# Patient Record
Sex: FEMALE | Race: ASIAN
[De-identification: names, ages, dates, MRNs, and addresses within clinical notes are randomized per-mention and may not be internally consistent; named-entity substitution may affect disease eponyms.]

---

## 2018-05-17 ENCOUNTER — HOSPITAL ENCOUNTER (INPATIENT)
Dept: HOSPITAL 68 - GNO | Age: 33
LOS: 3 days | End: 2018-05-20
Attending: OBSTETRICS & GYNECOLOGY | Admitting: OBSTETRICS & GYNECOLOGY
Payer: COMMERCIAL

## 2018-05-17 VITALS — BODY MASS INDEX: 24.24 KG/M2 | HEIGHT: 64 IN | WEIGHT: 142 LBS

## 2018-05-17 DIAGNOSIS — O36.5930: ICD-10-CM

## 2018-05-17 DIAGNOSIS — Z3A.37: ICD-10-CM

## 2018-05-17 LAB
ABSOLUTE GRANULOCYTE CT: 4.4 /CUMM (ref 1.4–6.5)
BASOPHILS # BLD: 0 /CUMM (ref 0–0.2)
BASOPHILS NFR BLD: 0.3 % (ref 0–2)
EOSINOPHIL # BLD: 0.1 /CUMM (ref 0–0.7)
EOSINOPHIL NFR BLD: 1.9 % (ref 0–5)
ERYTHROCYTE [DISTWIDTH] IN BLOOD BY AUTOMATED COUNT: 13.8 % (ref 11.5–14.5)
GRANULOCYTES NFR BLD: 72.1 % (ref 42.2–75.2)
HCT VFR BLD CALC: 33.4 % (ref 37–47)
LYMPHOCYTES # BLD: 1.3 /CUMM (ref 1.2–3.4)
MCH RBC QN AUTO: 32.5 PG (ref 27–31)
MCHC RBC AUTO-ENTMCNC: 34.1 G/DL (ref 33–37)
MCV RBC AUTO: 95.2 FL (ref 81–99)
MONOCYTES # BLD: 0.3 /CUMM (ref 0.1–0.6)
PLATELET # BLD: 187 /CUMM (ref 130–400)
PMV BLD AUTO: 8.7 FL (ref 7.4–10.4)
RED BLOOD CELL CT: 3.51 /CUMM (ref 4.2–5.4)
WBC # BLD AUTO: 6 /CUMM (ref 4.8–10.8)

## 2018-05-17 NOTE — HISTORY & PHYSICAL
General Information and HPI
MD Statement:
I have seen and personally examined MAINOR WILSON and documented this H&P.
 
The patient is a 32 year old female at [37] weeks and [3] days gestation who 
presented with a chief complaint of [IOL due to IUGR].
 
Source of Information: patient
Exam Limitations: no limitations
History of Present Illness:
33yo,  37 3/7wks, here for IOL due to IUGR, she has no complaints , denies 
ctxs, no VB or LOF, reports GFM. Prenatal care started at 9 wks, complicated by 
GDM, diet controlled. she  was found to have SGA , f/u with growth  scan at [facility],
 5/15/2018,EFW 4lb1oz 5%, normal REYMUNDO, normal doppler.
 
Allergies/Medications
Allergies:
Coded Allergies:
No Known Allergies (18)
 
Home Med list
Calcium Carbonate/Vitamin D3 (Calcium 500 + D Tablet) 500 MG-400 TABLET   1 TAB 
PO BID pregnancy  (Reported)
Folic Acid 1 MG TABLET   1 TAB PO DAILY pregnancy  (Reported)
Iron Carb,Gl/FA/B12/C/Docusate (Ferralet 90 Tablet) 90 MG-1 MG-12 MCG-120 MG-50 
MG TABLET   1 TAB PO DAILY anemia  (Reported)
Prenatal Vit No.130/Iron/FA (Prenatal Tablet) 27 MG IRON-800 MCG TABLET   1 TAB 
PO DAILY pregnancy  (Reported)
 
Compliance With Home Meds: GOOD
 
Past History
 
OB/Gyn History
: 1
Para: 0
Last Menstrual Period:
2017
Estimated Delivery Date:
2018
Past OB/Gyn History: none
 
Medical History
Blood Transfusion Hx: No
Neurological: NONE
EENT: NONE
Cardiovascular: NONE
Respiratory: NONE
Gastrointestinal: NONE
Hepatic: NONE
Renal: NONE
Musculoskeletal: NONE
Psychiatric: NONE
Endocrine: NONE
Blood Disorders: NONE
Cancer(s): NONE
GYN/Reproductive: NONE
 
Surgical History
Pertinent Surgical History: none
 
Past Family/Social History
 
Psychosocial History
Where do you live? Home
Smoking Status: Never Smoked
ETOH Use: denies use
Illicit Drug Use: denies illicit drug use
 
Review of Systems
 
Review of Systems
Constitutional:
Reports: no symptoms. 
EENTM:
Reports: no symptoms. 
Cardiovascular:
Reports: no symptoms. 
Respiratory:
Reports: no symptoms. 
GI:
Reports: no symptoms. 
Genitourinary:
Reports: no symptoms. 
Musculoskeletal:
Reports: no symptoms. 
Skin:
Reports: no symptoms. 
Neurological/Psychological:
Reports: no symptoms. 
Hematologic/Endocrine:
Reports: no symptoms. 
Immunologic/Allergic:
Reports: no symptoms. 
All Other Systems: Reviewed and Negative
 
Exam & Diagnostic Data
Last 24 Hrs of Vital Signs/I&O
 Intake & Output
 
 
  1600  0800  0000
 
Intake Total   
 
Output Total   
 
Balance   
 
    
 
Patient 64.41 kg  
 
Weight   
 
 
 
 
Obstetric Exam
Wgt Gained During Pregnancy:
30lb
Pelvimetry:
adequate
Dilation (cm): 0
Effacement (%): 20
Station: -3
Membranes: intact
Fluid: unknown
Fundal Height (cm): 34
Multiple Gestation? No
Contractions:
none
Infant #1 -
   FHR Baseline: 130
   Category: 1
   Estimated Fetal Weight:
4lb1oz 
   Fetal Presentation:
vertex
Patient for Induction? Yes
Cardona Score
 
 
Cardona Score Response Value
 
Cervix Position: mid-position 1
 
Cervix Consistency: medium 1
 
Cervix Effacement: 0-30% 0
 
Cervix Dilation: closed 0
 
Cervix Station: -3 0
 
Total   2
 
 
Physical Exam:
VSS
General: NAD
Abdomen: soft, gravid, nontender
Ext: DCT (-)
 
Prenatal Labs
Blood Type & Rh:
O positive
Antibody Screen:
negative
Hct/Hgb & Platelets #1:
11.8/36.4%,PLT [id number]
Hct/Hgb & Platelets #2:
9.7/31.4%,PLT [id number]
Rubella:
non immune
VDRL #1:
negative
VDRL #2:
negative
HbsAg:
negative
HIV #1:
negative
HIV #2
negative
1 Hr P
3 Hr P/199/168/107
Group B Strep:
negative
Initial Ultrasound:
IUP at 9 wks
Anatomy Ultrasound:
isolated intracardiac echogenic focus
Ultrasound for EFW:
4lb1oz 
Genetic Testing:
nl
Last 24 Hrs of Labs/Dieter:
 Laboratory Tests
 
18 0854:
Hemoglobin A1c 4.7, CBC w Diff NO MAN DIFF REQ, RBC 3.51  L, MCV 95.2, MCH 32.5 
H, MCHC 34.1, RDW 13.8, MPV 8.7, Gran % 72.1, Lymphocytes % 20.8, Monocytes % 
4.9, Eosinophils % 1.9, Basophils % 0.3, Absolute Granulocytes 4.4, Absolute 
Lymphocytes 1.3, Absolute Monocytes 0.3, Absolute Eosinophils 0.1, Absolute 
Basophils 0, Urinalysis LIGHT  H, Urine Color YEL, Urine Clarity CLEAR, Urine pH
7.0, Ur Specific Gravity 1.010, Urine Protein NEG, Urine Ketones NEG, Urine 
Nitrite NEG, Urine Bilirubin NEG, Urine Urobilinogen 0.2, Ur Leukocyte Esterase 
TRACE  H, Ur Microscopic SEDIMENT EXAMINED, Urine RBC 1-3, Urine WBC 1-3  H, Ur 
Epithelial Cells FEW, Urine Bacteria MOD  H, Urine Hemoglobin NEG, Urine Glucose
NEG
 
 
Assessment/Plan
Assessment/Plan:
33yo,  37 3/7wks,IUGR
1. admit pt, admisison labs.
2. R/B of IOL d/w pt, cervical ripening with misoptostol and del real ballon d/w pt
, she understand and agreed, all questions answered, informed consent obtained. 
del real ballon placed in sterile condition, 30 ml NS used to inflate the balloon, 
1st dose of misoprostol placed. 
3. will monitor closely
 
As Ranked By This Provider
Problem List:
 1. Pregnancy
 
 2. GDM (gestational diabetes mellitus)
 
 3. IUGR (intrauterine growth restriction)
 
 
Core Measures
 
Venous Thromboembolism
VTE Risk Factors Pregnancy/Postpartum
No Mechanical VTE Prophylaxis d/t LowRisk-No Interven Req'd
No VTE Pharm Prophylaxis d/t LowRisk-No Interven Req'd
 
Attending MD Review Statement
 
Attending Statement
Attending MD Statement: examined this patient, discussed with family, discussed 
w/nursing

## 2018-05-17 NOTE — PN- OBGYN
Surgical Brief Attending Note
Brief Attending Note:
Late entry for 1: 30PM
pt c/o ctxs pain
on TOCO: ctxs q 2-3 min, FHR base line 140, moderate variability, + acels, no 
decels
cervix : 1 cm, del real ballon in place. small amount blood seen in tube
will defere 2nd dose of misoprostol. will monitor closely

## 2018-05-17 NOTE — PN- OBGYN
Surgical Brief Attending Note
Brief Attending Note:
Pt c/o feels ctxs pain
on TOCO: ctxs q 2-4 min, FHT baseline 130, moderate variability, + acels, no 
decels. 
VE: defered, del real balloon in place
will monitor closely

## 2018-05-18 PROCEDURE — 0KQM0ZZ REPAIR PERINEUM MUSCLE, OPEN APPROACH: ICD-10-PCS | Performed by: OBSTETRICS & GYNECOLOGY

## 2018-05-18 NOTE — LABOR & DELIVERY SUMMARY
Delivery Summary
Vaginal Delivery:
   Vaginal: spontaneous
Episiotomy/Lacerations:
   Episiotomy/Lacerations: 2 DEGREE 
   Type:
MIDLINE
   Repair:
3-0 POLYSORB
   Anesthesia:
BUPIVICAINE 8 CC
Placenta:
   Placenta: spontanteous (ONE), nuchal cord (x_)
Anesthesia: block
Baby's Weight:
4#4OZ
Apgars - 1 Min: 9
Apgars - 5 Min: 9
Additional Comments:
iMMEDIATE pp HEMORRHAGE FOLLOWING PLACENTA RESPONDED TO iv PITOCIN, IM 
METHERGINE AND FUNDAL MASSAGE

## 2018-05-19 LAB
ABSOLUTE GRANULOCYTE CT: 10.4 /CUMM (ref 1.4–6.5)
BASOPHILS # BLD: 0 /CUMM (ref 0–0.2)
BASOPHILS NFR BLD: 0.2 % (ref 0–2)
EOSINOPHIL # BLD: 0.1 /CUMM (ref 0–0.7)
EOSINOPHIL NFR BLD: 1 % (ref 0–5)
ERYTHROCYTE [DISTWIDTH] IN BLOOD BY AUTOMATED COUNT: 14.1 % (ref 11.5–14.5)
GRANULOCYTES NFR BLD: 76.7 % (ref 42.2–75.2)
HCT VFR BLD CALC: 28.4 % (ref 37–47)
LYMPHOCYTES # BLD: 2.3 /CUMM (ref 1.2–3.4)
MCH RBC QN AUTO: 32.6 PG (ref 27–31)
MCHC RBC AUTO-ENTMCNC: 33.7 G/DL (ref 33–37)
MCV RBC AUTO: 96.8 FL (ref 81–99)
MONOCYTES # BLD: 0.8 /CUMM (ref 0.1–0.6)
PLATELET # BLD: 166 /CUMM (ref 130–400)
PMV BLD AUTO: 8.2 FL (ref 7.4–10.4)
RED BLOOD CELL CT: 2.93 /CUMM (ref 4.2–5.4)
WBC # BLD AUTO: 13.6 /CUMM (ref 4.8–10.8)

## 2018-05-19 NOTE — PN- POST DELIVERY/GYN
Subjective
Subjective:
feeling well
 
Review of Systems
Constitutional:
Denies: chills, fever. 
EENTM:
Denies: blurred vision, double vision, visual changes. 
Cardiovascular:
Denies: chest pain. 
Respiratory:
Denies: short of breath. 
Gastrointestinal:
Denies: nausea, vomiting. 
Neurological/Psychological:
Denies: anxiety, depressed. 
 
Objective
Last 24 Hrs of Vital Signs/I&O
vss
 
Physical Exam
General Appearance Alert, Oriented X3, Cooperative, No Acute Distress
Cardiovascular Regular Rate
Lungs Clear to Auscultation
Abdomen Soft, fundus firm
Pelvic (FEMALE) lochia serosanganous 
Current Medications:
 Current Medications
 
 
  Sig/Sangita Start time  Last
 
Medication Dose Route Stop Time Status Admin
 
Acetaminophen 650 MG Q4P PRN 05/18 1415 AC 
 
  PO   
 
Bupivacaine HCl 10 ML ONCE ONE 05/18 1415 DC 
 
  SC 05/18 1416  
 
Bupivacaine HCl 30 ML .STK-MED ONE 05/18 1302 DC 
 
  EPID 05/18 1303  
 
Hydroxyzine HCl 50 MG AT BEDTIME AS NEED.. 05/18 1415 AC 
 
  PO   
 
Ibuprofen 800 MG Q6P PRN 05/18 1415 AC 05/19
 
  PO   0946
 
Lactated Ringer's 1,000 ML Q8H 05/17 0900 DC 05/18
 
  IV   0834
 
Magnesium Hydroxide 30 ML DAILY PRN 05/18 1415 AC 
 
  PO   
 
Methylergonovine  0.2 MG STAT STA 05/18 1409 DC 
 
Maleate  IM 05/18 1410  
 
Misoprostol 25 MCG Q4P PRN 05/17 0900 DC 05/17
 
  VAG   0958
 
Oxytocin 20 UNITS .STK-MED ONE 05/18 1757 DC 
 
  IV 05/18 1758  
 
Oxytocin 20 UNITS Q5H 05/18 1415 DC 
 
Lactated Ringer's 1,000 ML IV 05/18 1914  
 
Oxytocin 30 UNITS PER PROTOCL 05/18 0730 DC 05/18
 
Lactated Ringer's 500 ML IV   0800
 
 
 
Last 24 Hrs of Labs/Dieter:
 Laboratory Tests
 
05/19/18 0600:
CBC w Diff NO MAN DIFF REQ, RBC 2.93  L, MCV 96.8, MCH 32.6  H, MCHC 33.7, RDW 
14.1, MPV 8.2, Gran % 76.7  H, Lymphocytes % 16.6  L, Monocytes % 5.5, 
Eosinophils % 1.0, Basophils % 0.2, Absolute Granulocytes 10.4  H, Absolute 
Lymphocytes 2.3, Absolute Monocytes 0.8  H, Absolute Eosinophils 0.1, Absolute 
Basophils 0
 
 
Assessment/Plan
Assessment/Plan
PPD #1 vss afebrile 
plan ambulate advance diet
Problem List:
 1. IUGR (intrauterine growth restriction)
 
 
Attending MD Review Statement
 
Attending Statement
Attending MD Statement: examined this patient, discussed with family, discussed 
with nursing